# Patient Record
(demographics unavailable — no encounter records)

---

## 2025-03-10 NOTE — HEALTH RISK ASSESSMENT
[No] : In the past 12 months have you used drugs other than those required for medical reasons? No [0] : 2) Feeling down, depressed, or hopeless: Not at all (0) [PHQ-2 Negative - No further assessment needed] : PHQ-2 Negative - No further assessment needed [Patient reported PAP Smear was normal] : Patient reported PAP Smear was normal [None] : None [With Significant Other] : lives with significant other [Employed] : employed [] :  [Fully functional (bathing, dressing, toileting, transferring, walking, feeding)] : Fully functional (bathing, dressing, toileting, transferring, walking, feeding) [Fully functional (using the telephone, shopping, preparing meals, housekeeping, doing laundry, using] : Fully functional and needs no help or supervision to perform IADLs (using the telephone, shopping, preparing meals, housekeeping, doing laundry, using transportation, managing medications and managing finances) [Never] : Never [Audit-CScore] : 0 [de-identified] : yoga, pilates [de-identified] : balanced [[id number]] : 0 [HIV test declined] : HIV test declined [Hepatitis C test declined] : Hepatitis C test declined [Change in mental status noted] : No change in mental status noted [PapSmearDate] : 01/24 [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

## 2025-03-10 NOTE — PHYSICAL EXAM
[No Acute Distress] : no acute distress [Well-Appearing] : well-appearing [No Lymphadenopathy] : no lymphadenopathy [Supple] : supple [Thyroid Normal, No Nodules] : the thyroid was normal and there were no nodules present [Coordination Grossly Intact] : coordination grossly intact [No Focal Deficits] : no focal deficits [Normal Gait] : normal gait [Normal] : affect was normal and insight and judgment were intact [de-identified] : +gravid

## 2025-03-10 NOTE — HISTORY OF PRESENT ILLNESS
[FreeTextEntry1] : Establish care, CPE [de-identified] : 35 yo female presents to establish care, CPE. Last CPE was about a year ago. Patient previously followed with derm Dr. Norman for eczema, symptoms well controlled. Patient follows with OBGYN Dr. Yanni Hamlin or Dr. Anahy Norman regularly, last pap smear last year, WNL. Patient currently pregnant, due end of May, taking ASA 81mg qd and prenatal.  Reports hx depression, currently on Desvenlafaxine 25mg qd. Sees therapist every other week.  Overall feeling well today.

## 2025-03-10 NOTE — PLAN
[FreeTextEntry1] : HCM -Defer labs for now, patient regularly seeing gyn and getting routine bloodwork per pregnancy -UTD pap smear -UTD derm skin check -States she is scheduled for Tdap next week with OBGYN, received flu vaccine this year